# Patient Record
Sex: MALE | Race: WHITE | NOT HISPANIC OR LATINO | URBAN - METROPOLITAN AREA
[De-identification: names, ages, dates, MRNs, and addresses within clinical notes are randomized per-mention and may not be internally consistent; named-entity substitution may affect disease eponyms.]

---
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
## 2023-06-22 PROCEDURE — 86803 HEPATITIS C AB TEST: CPT | Performed by: FAMILY MEDICINE

## 2023-10-06 PROBLEM — F90.0 ATTENTION DEFICIT HYPERACTIVITY DISORDER (ADHD), PREDOMINANTLY INATTENTIVE TYPE: Status: ACTIVE | Noted: 2022-06-16

## 2024-01-08 PROBLEM — L20.82 FLEXURAL ECZEMA: Status: ACTIVE | Noted: 2024-01-08

## 2024-07-27 ENCOUNTER — ON-DEMAND VIRTUAL (OUTPATIENT)
Dept: URGENT CARE | Facility: CLINIC | Age: 19
End: 2024-07-27

## 2024-07-27 DIAGNOSIS — J02.9 SORE THROAT: ICD-10-CM

## 2024-07-27 DIAGNOSIS — J06.9 URI WITH COUGH AND CONGESTION: Primary | ICD-10-CM

## 2024-07-27 PROCEDURE — 99213 OFFICE O/P EST LOW 20 MIN: CPT | Mod: 95,,, | Performed by: NURSE PRACTITIONER

## 2024-07-27 RX ORDER — BENZONATATE 100 MG/1
100 CAPSULE ORAL 3 TIMES DAILY PRN
Qty: 30 CAPSULE | Refills: 0 | Status: SHIPPED | OUTPATIENT
Start: 2024-07-27 | End: 2024-08-06

## 2024-07-27 RX ORDER — PREDNISONE 20 MG/1
20 TABLET ORAL DAILY
Qty: 5 TABLET | Refills: 0 | Status: SHIPPED | OUTPATIENT
Start: 2024-07-27 | End: 2024-08-01

## 2024-07-27 NOTE — PROGRESS NOTES
Subjective:      Patient ID: Torrey Dennis is a 19 y.o. male.    Vitals:  vitals were not taken for this visit.     Chief Complaint: Sinus Problem (Sore throat)      Visit Type: TELE AUDIOVISUAL    Present with the patient at the time of consultation: TELEMED PRESENT WITH PATIENT: None        Past Medical History:   Diagnosis Date    ADHD (attention deficit hyperactivity disorder)      History reviewed. No pertinent surgical history.  Review of patient's allergies indicates:  No Known Allergies  Current Outpatient Medications on File Prior to Visit   Medication Sig Dispense Refill    dextroamphetamine-amphetamine (ADDERALL XR) 30 MG 24 hr capsule Take 1 capsule (30 mg total) by mouth every morning. 30 capsule 0    dextroamphetamine-amphetamine (ADDERALL XR) 30 MG 24 hr capsule Take 1 capsule (30 mg total) by mouth every morning. 30 capsule 0    dextroamphetamine-amphetamine (ADDERALL XR) 30 MG 24 hr capsule Take 1 capsule (30 mg total) by mouth every morning. 30 capsule 0    dextroamphetamine-amphetamine (ADDERALL) 10 mg Tab Take 1 tablet (10 mg total) by mouth after lunch. 30 tablet 0    meloxicam (MOBIC) 15 MG tablet Take 15 mg by mouth once daily.      triamcinolone acetonide 0.1% (KENALOG) 0.1 % cream Apply topically 2 (two) times daily. 80 g 1     No current facility-administered medications on file prior to visit.     No family history on file.    Medications Ordered                [id number] [address] & [address] [address]    Telephone: [phone]   Fax: [phone]   Hours: Open 24 hours                         E-Prescribed (2 of 2)              benzonatate (TESSALON) 100 MG capsule    Sig: Take 1 capsule (100 mg total) by mouth 3 (three) times daily as needed for Cough.       Start: 7/27/24     Quantity: 30 capsule Refills: 0                         predniSONE (DELTASONE) 20 MG tablet    Sig: Take 1  tablet (20 mg total) by mouth once daily. for 5 days       Start: 7/27/24     Quantity: 5 tablet Refills: 0                           Ohs Peq Odvv Intake    7/27/2024 11:47 AM CDT - Filed by Patient   What is your current physical address in the event of a medical emergency? [address] [facility]   Are you able to take your vital signs? No   Please attach any relevant images or files          20 yo male with c/o sore throat for two days. He states he is returning from [place]. He states he has not been able to sleep at night. He does have a cough. He denies fever. He states positive sinus congestion.       ROS     Objective:   The physical exam was conducted virtually.  LOCATION OF PATIENT home  Physical Exam   Constitutional: He is oriented to person, place, and time. He appears well-developed.   HENT:   Head: Normocephalic and atraumatic.   Ears:   Right Ear: Hearing, tympanic membrane and external ear normal.   Left Ear: Hearing, tympanic membrane and external ear normal.   Nose: Nose normal.   Mouth/Throat: Uvula is midline, oropharynx is clear and moist and mucous membranes are normal.   Eyes: Conjunctivae and EOM are normal. Pupils are equal, round, and reactive to light.   Neck: Neck supple.   Cardiovascular: Normal rate.   Pulmonary/Chest: Effort normal and breath sounds normal.   Musculoskeletal: Normal range of motion.         General: Normal range of motion.   Neurological: He is alert and oriented to person, place, and time.   Skin: Skin is warm.   Psychiatric: His behavior is normal. Thought content normal.   Nursing note and vitals reviewed.      Assessment:     1. URI with cough and congestion    2. Sore throat        Plan:   Follow up with your primary care provider if symptoms persist.  Go to the Emergency room for worsening of symptoms.     -Below are suggestions for symptomatic relief:              -Tylenol every 4 hours OR ibuprofen every 6 hours as needed for pain/fever.              -Salt water  gargles to soothe throat pain.              -Chloroseptic spray also helps to numb throat pain.              -Nasal saline spray reduces inflammation and dryness.              -Warm face compresses to help with facial sinus pain/pressure.              -Vicks vapor rub at night.              -Flonase OTC or Nasacort OTC for nasal congestion.              -Simple foods like chicken noodle soup.              -Delsym helps with coughing at night              -Zyrtec/Claritin during the day & Benadryl at night may help with allergies.     If you DO NOT have Hypertension or any history of palpitations, it is ok to take over the counter Sudafed or Mucinex D or Allegra-D or Claritin-D or Zyrtec-D.  If you do take one of the above, it is ok to combine that with plain over the counter Mucinex or Allegra or Claritin or Zyrtec. If, for example, you are taking Zyrtec -D, you can combine that with Mucinex, but not Mucinex-D.  If you are taking Mucinex-D, you can combine that with plain Allegra or Claritin or Zyrtec.   If you DO have Hypertension or palpitations, it is safe to take Coricidin HBP for relief of sinus symptoms.     Please follow up with your Primary care provider within 2-5 days if your signs and symptoms have not resolved or worsen.      If your condition worsens or fails to improve we recommend that you receive another evaluation at the emergency room immediately or contact your primary medical clinic to discuss your concerns.   You must understand that you have received an Urgent Care treatment only and that you may be released before all of your medical problems are known or treated. You, the patient, will arrange for follow up care as instructed.      RED FLAGS/WARNING SYMPTOMS DISCUSSED WITH PATIENT THAT WOULD WARRANT EMERGENT MEDICAL ATTENTION. PATIENT VERBALIZED UNDERSTANDING.       URI with cough and congestion  -     predniSONE (DELTASONE) 20 MG tablet; Take 1 tablet (20 mg total) by mouth once daily. for 5  days  Dispense: 5 tablet; Refill: 0  -     benzonatate (TESSALON) 100 MG capsule; Take 1 capsule (100 mg total) by mouth 3 (three) times daily as needed for Cough.  Dispense: 30 capsule; Refill: 0    Sore throat  -     predniSONE (DELTASONE) 20 MG tablet; Take 1 tablet (20 mg total) by mouth once daily. for 5 days  Dispense: 5 tablet; Refill: 0  -     benzonatate (TESSALON) 100 MG capsule; Take 1 capsule (100 mg total) by mouth 3 (three) times daily as needed for Cough.  Dispense: 30 capsule; Refill: 0

## 2024-08-08 PROCEDURE — 87389 HIV-1 AG W/HIV-1&-2 AB AG IA: CPT | Performed by: FAMILY MEDICINE

## 2024-08-08 PROCEDURE — 86803 HEPATITIS C AB TEST: CPT | Performed by: FAMILY MEDICINE

## 2025-02-07 ENCOUNTER — HOSPITAL ENCOUNTER (EMERGENCY)
Facility: HOSPITAL | Age: 20
Discharge: HOME | End: 2025-02-07
Attending: FAMILY MEDICINE
Payer: COMMERCIAL

## 2025-02-07 VITALS
HEIGHT: 70 IN | RESPIRATION RATE: 17 BRPM | SYSTOLIC BLOOD PRESSURE: 109 MMHG | TEMPERATURE: 98 F | WEIGHT: 185 LBS | DIASTOLIC BLOOD PRESSURE: 73 MMHG | HEART RATE: 92 BPM | BODY MASS INDEX: 26.48 KG/M2 | OXYGEN SATURATION: 99 %

## 2025-02-07 DIAGNOSIS — R07.9 CHEST PAIN: ICD-10-CM

## 2025-02-07 DIAGNOSIS — F41.0 PANIC ATTACK: ICD-10-CM

## 2025-02-07 DIAGNOSIS — F41.9 ANXIETY: Primary | ICD-10-CM

## 2025-02-07 DIAGNOSIS — R73.9 HYPERGLYCEMIA: ICD-10-CM

## 2025-02-07 LAB
ALBUMIN SERPL-MCNC: 4.4 G/DL (ref 3.5–5)
ALBUMIN/GLOB SERPL: 1.2 RATIO (ref 1.1–2)
ALP SERPL-CCNC: 73 UNIT/L (ref 40–150)
ALT SERPL-CCNC: 22 UNIT/L (ref 0–55)
ANION GAP SERPL CALC-SCNC: 11 MEQ/L
AST SERPL-CCNC: 20 UNIT/L (ref 5–34)
BASOPHILS # BLD AUTO: 0.07 X10(3)/MCL
BASOPHILS NFR BLD AUTO: 0.7 %
BILIRUB SERPL-MCNC: 0.3 MG/DL
BUN SERPL-MCNC: 16.7 MG/DL (ref 8.9–20.6)
CALCIUM SERPL-MCNC: 9.4 MG/DL (ref 8.4–10.2)
CHLORIDE SERPL-SCNC: 106 MMOL/L (ref 98–107)
CO2 SERPL-SCNC: 21 MMOL/L (ref 22–29)
CREAT SERPL-MCNC: 1.17 MG/DL (ref 0.72–1.25)
CREAT/UREA NIT SERPL: 14
D DIMER PPP IA.FEU-MCNC: <0.27 UG/ML FEU (ref 0–0.5)
EOSINOPHIL # BLD AUTO: 0.11 X10(3)/MCL (ref 0–0.9)
EOSINOPHIL NFR BLD AUTO: 1.1 %
ERYTHROCYTE [DISTWIDTH] IN BLOOD BY AUTOMATED COUNT: 12.3 % (ref 11.5–17)
GFR SERPLBLD CREATININE-BSD FMLA CKD-EPI: >60 ML/MIN/1.73/M2
GLOBULIN SER-MCNC: 3.6 GM/DL (ref 2.4–3.5)
GLUCOSE SERPL-MCNC: 158 MG/DL (ref 74–100)
HCT VFR BLD AUTO: 44.1 % (ref 42–52)
HGB BLD-MCNC: 15.5 G/DL (ref 14–18)
IMM GRANULOCYTES # BLD AUTO: 0.02 X10(3)/MCL (ref 0–0.04)
IMM GRANULOCYTES NFR BLD AUTO: 0.2 %
LIPASE SERPL-CCNC: 18 U/L
LYMPHOCYTES # BLD AUTO: 3.06 X10(3)/MCL (ref 0.6–4.6)
LYMPHOCYTES NFR BLD AUTO: 31.2 %
MAGNESIUM SERPL-MCNC: 2.1 MG/DL (ref 1.6–2.6)
MCH RBC QN AUTO: 31.6 PG (ref 27–31)
MCHC RBC AUTO-ENTMCNC: 35.1 G/DL (ref 33–36)
MCV RBC AUTO: 90 FL (ref 80–94)
MONOCYTES # BLD AUTO: 1.18 X10(3)/MCL (ref 0.1–1.3)
MONOCYTES NFR BLD AUTO: 12 %
NEUTROPHILS # BLD AUTO: 5.36 X10(3)/MCL (ref 2.1–9.2)
NEUTROPHILS NFR BLD AUTO: 54.8 %
NRBC BLD AUTO-RTO: 0 %
OHS QRS DURATION: 86 MS
OHS QTC CALCULATION: 459 MS
PLATELET # BLD AUTO: 292 X10(3)/MCL (ref 130–400)
PMV BLD AUTO: 11.2 FL (ref 7.4–10.4)
POCT GLUCOSE: 191 MG/DL (ref 70–110)
POTASSIUM SERPL-SCNC: 3.2 MMOL/L (ref 3.5–5.1)
PROT SERPL-MCNC: 8 GM/DL (ref 6.4–8.3)
RBC # BLD AUTO: 4.9 X10(6)/MCL (ref 4.7–6.1)
SODIUM SERPL-SCNC: 138 MMOL/L (ref 136–145)
TROPONIN I SERPL-MCNC: <0.01 NG/ML (ref 0–0.04)
TSH SERPL-ACNC: 2.08 UIU/ML (ref 0.35–4.94)
WBC # BLD AUTO: 9.8 X10(3)/MCL (ref 4.5–11.5)

## 2025-02-07 PROCEDURE — 85379 FIBRIN DEGRADATION QUANT: CPT | Performed by: FAMILY MEDICINE

## 2025-02-07 PROCEDURE — 84443 ASSAY THYROID STIM HORMONE: CPT | Performed by: FAMILY MEDICINE

## 2025-02-07 PROCEDURE — 99285 EMERGENCY DEPT VISIT HI MDM: CPT | Mod: 25

## 2025-02-07 PROCEDURE — 96372 THER/PROPH/DIAG INJ SC/IM: CPT | Performed by: FAMILY MEDICINE

## 2025-02-07 PROCEDURE — 83690 ASSAY OF LIPASE: CPT | Performed by: FAMILY MEDICINE

## 2025-02-07 PROCEDURE — 84484 ASSAY OF TROPONIN QUANT: CPT | Performed by: FAMILY MEDICINE

## 2025-02-07 PROCEDURE — 82962 GLUCOSE BLOOD TEST: CPT

## 2025-02-07 PROCEDURE — 25000003 PHARM REV CODE 250: Performed by: FAMILY MEDICINE

## 2025-02-07 PROCEDURE — 96360 HYDRATION IV INFUSION INIT: CPT

## 2025-02-07 PROCEDURE — 63600175 PHARM REV CODE 636 W HCPCS: Performed by: FAMILY MEDICINE

## 2025-02-07 PROCEDURE — 80053 COMPREHEN METABOLIC PANEL: CPT | Performed by: FAMILY MEDICINE

## 2025-02-07 PROCEDURE — 83735 ASSAY OF MAGNESIUM: CPT | Performed by: FAMILY MEDICINE

## 2025-02-07 PROCEDURE — 85025 COMPLETE CBC W/AUTO DIFF WBC: CPT | Performed by: FAMILY MEDICINE

## 2025-02-07 PROCEDURE — 93005 ELECTROCARDIOGRAM TRACING: CPT

## 2025-02-07 RX ORDER — HYDROXYZINE HYDROCHLORIDE 50 MG/1
50 TABLET, FILM COATED ORAL 3 TIMES DAILY PRN
Qty: 30 TABLET | Refills: 0 | Status: SHIPPED | OUTPATIENT
Start: 2025-02-07

## 2025-02-07 RX ORDER — HYDROXYZINE 50 MG/ML
100 INJECTION, SOLUTION INTRAMUSCULAR ONCE
Status: COMPLETED | OUTPATIENT
Start: 2025-02-07 | End: 2025-02-07

## 2025-02-07 RX ORDER — SODIUM CHLORIDE 9 MG/ML
1000 INJECTION, SOLUTION INTRAVENOUS
Status: COMPLETED | OUTPATIENT
Start: 2025-02-07 | End: 2025-02-07

## 2025-02-07 RX ADMIN — HYDROXYZINE HYDROCHLORIDE 100 MG: 50 INJECTION, SOLUTION INTRAMUSCULAR at 02:02

## 2025-02-07 RX ADMIN — SODIUM CHLORIDE 1000 ML: 9 INJECTION, SOLUTION INTRAVENOUS at 01:02

## 2025-02-07 NOTE — ED PROVIDER NOTES
Encounter Date: 2/7/2025       History     Chief Complaint   Patient presents with    Chest Pain     Pt arrives stating he feels as if he is going to pass out and he is having chest pain. 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  Patient states she was apparently well until about 1 hour prior to presentation when he started feeling markedly weak, having palpitations, shortness of breath, extremely lightheaded, and had a presyncopal episode.  Patient states he then developed a substernal chest pain which started while at rest, pain is described as a pressure-like sensation, radiating to his neck, has no known aggravating or relieving factors, and he denies any similar symptomatology in the past.  Patient denies any history of thyroid disease, no history of alcohol or drug use, no history of anxiety, no history of panic attacks, but he admits to a known history of ADHD currently treated with Adderall.  Patient states he still is having symptoms here in the emergency room but just not as bad    The history is provided by the patient. No  was used.     Review of patient's allergies indicates:  No Known Allergies  Past Medical History:   Diagnosis Date    ADHD (attention deficit hyperactivity disorder)      Past Surgical History:   Procedure Laterality Date    APPENDECTOMY  10/10/2023     No family history on file.  Social History     Tobacco Use    Smoking status: Never    Smokeless tobacco: Never   Substance Use Topics    Alcohol use: Never    Drug use: Never     Review of Systems   Constitutional:  Positive for fatigue. Negative for activity change, chills, diaphoresis and fever.   HENT:  Negative for congestion, ear pain, nosebleeds, rhinorrhea, sinus pressure and sore throat.    Eyes:  Negative for visual disturbance.   Respiratory:  Positive  for shortness of breath. Negative for cough, chest tightness and wheezing.    Cardiovascular:  Positive for chest pain and palpitations.   Gastrointestinal:  Negative for abdominal pain, constipation, diarrhea, nausea and vomiting.   Genitourinary:  Negative for dysuria.   Musculoskeletal:  Negative for arthralgias, back pain, joint swelling and myalgias.   Skin:  Negative for color change and rash.   Neurological:  Positive for light-headedness. Negative for dizziness, syncope, weakness, numbness and headaches.   Psychiatric/Behavioral:  Negative for behavioral problems, self-injury and suicidal ideas.    All other systems reviewed and are negative.      Physical Exam     Initial Vitals [02/07/25 0153]   BP Pulse Resp Temp SpO2   (!) 176/125 109 20 97.6 °F (36.4 °C) 100 %      MAP       --         Physical Exam    Nursing note and vitals reviewed.  Constitutional: He appears well-developed and well-nourished. He is not diaphoretic. No distress.   HENT:   Head: Normocephalic and atraumatic.   Right Ear: External ear normal.   Left Ear: External ear normal.   Nose: Nose normal. Mouth/Throat: Oropharynx is clear and moist. No oropharyngeal exudate.   Eyes: Conjunctivae and EOM are normal. Pupils are equal, round, and reactive to light. No scleral icterus.   Neck: Neck supple. No thyromegaly present. No tracheal deviation present.   Normal range of motion.  Cardiovascular:  Regular rhythm, normal heart sounds and intact distal pulses.     Exam reveals no gallop and no friction rub.       No murmur heard.  Tachycardia   Pulmonary/Chest: Breath sounds normal. No respiratory distress. He has no wheezes. He has no rhonchi. He has no rales.   Abdominal: Abdomen is soft. Bowel sounds are normal. There is no abdominal tenderness. There is no rebound and no guarding.   Musculoskeletal:         General: No tenderness or edema. Normal range of motion.      Cervical back: Normal range of motion and neck supple.      Lymphadenopathy:     He has no cervical adenopathy.   Neurological: He is alert and oriented to person, place, and time. He has normal strength. No cranial nerve deficit. GCS score is 15. GCS eye subscore is 4. GCS verbal subscore is 5. GCS motor subscore is 6.   Skin: Skin is warm and dry.   Psychiatric: His speech is normal and behavior is normal. Judgment and thought content normal. His mood appears anxious. He is not actively hallucinating. Cognition and memory are normal. He is attentive.         ED Course   Procedures  Labs Reviewed   COMPREHENSIVE METABOLIC PANEL - Abnormal       Result Value    Sodium 138      Potassium 3.2 (*)     Chloride 106      CO2 21 (*)     Glucose 158 (*)     Blood Urea Nitrogen 16.7      Creatinine 1.17      Calcium 9.4      Protein Total 8.0      Albumin 4.4      Globulin 3.6 (*)     Albumin/Globulin Ratio 1.2      Bilirubin Total 0.3      ALP 73      ALT 22      AST 20      eGFR >60      Anion Gap 11.0      BUN/Creatinine Ratio 14     CBC WITH DIFFERENTIAL - Abnormal    WBC 9.80      RBC 4.90      Hgb 15.5      Hct 44.1      MCV 90.0      MCH 31.6 (*)     MCHC 35.1      RDW 12.3      Platelet 292      MPV 11.2 (*)     Neut % 54.8      Lymph % 31.2      Mono % 12.0      Eos % 1.1      Basophil % 0.7      Imm Grans % 0.2      Neut # 5.36      Lymph # 3.06      Mono # 1.18      Eos # 0.11      Baso # 0.07      Imm Gran # 0.02      NRBC% 0.0     POCT GLUCOSE - Abnormal    POCT Glucose 191 (*)    LIPASE - Normal    Lipase Level 18     MAGNESIUM - Normal    Magnesium Level 2.10     TSH - Normal    TSH 2.081     TROPONIN I - Normal    Troponin-I <0.010     D DIMER, QUANTITATIVE - Normal    D-Dimer <0.27     CBC W/ AUTO DIFFERENTIAL    Narrative:     The following orders were created for panel order CBC Auto Differential.  Procedure                               Abnormality         Status                     ---------                               -----------         ------                      CBC with Differential[[id number]]       Abnormal            Final result                 Please view results for these tests on the individual orders.     EKG Readings: (Independently Interpreted)   ECG at 1:53 a.m. interpreted by me    Normal sinus rhythm with a ventricular rate of 107 beats per minute, normal axis, no ST segment changes, no new onset Q-waves, no appreciable bundle branch block, normal T-waves, questionably positive voltage criteria for LVH, normal intervals, no delta waves       Imaging Results              X-Ray Chest PA And Lateral (Preliminary result)  Result time 02/07/25 03:25:20      Wet Read by Joan Ahumada MD (02/07/25 03:25:20, [facility] - Emergency Dept, Emergency Medicine)    No acute abnormalities                                     Medications   0.9% NaCl infusion (0 mLs Intravenous Stopped 2/7/25 0248)   hydrOXYzine injection 100 mg (100 mg Intramuscular Given 2/7/25 0205)     Medical Decision Making  Patient's clinical picture seems questionably in keeping with anxiety and a panic attack and due to this IV access is obtained and patient is hydrated with normal saline IV fluids and given hydroxyzine 100 mg IM x1 which he tolerates.  Patient has a cascade of labs drawn and a chest x-ray done. Labs returned relatively within normal limits except for CMP with a potassium of 3.2, bicarb of 21, and glucose of 158.  Accu-Chek returned with a glucose of 191, CBC shows a normal white blood cell count, normal hemoglobin, normal platelets.  D-dimer is noted to be negative, lipase is negative, magnesium is normal at 2.1, and TSH is normal at 2.08.  Chest x-ray returned showing no acute abnormalities and these findings are reviewed with the patient and he verbalizes his understanding.  Patient is monitored for a prolonged period of time here in the emergency room and remained stable.  Upon re-evaluation patient states he feels much better and I do have a high degree  of suspicion that patient's clinical picture seems to be in keeping with anxiety and panic attacks.  Patient states he frequently has these episodes of palpitation and his primary doctor placed him on propranolol for these episodes.  Patient is advised to discuss further with his primary medical doctor about possibly placing him on long-term medication for anxiety.  Patient also states he feels palpitations might be atrial fibrillation and he is advised to discuss this further with his primary medical doctor to determine if he will benefit from a Holter monitor.  Patient at this point will be discharged home with hydroxyzine 50 mg p.o. t.i.d. p.r.n. and he is encouraged to return to the ER if clinical picture worsens.  Patient verbalizes his understanding and his condition upon discharge is stable, vitals remained stable, and he is able to ambulate independently out of the emergency room.    Amount and/or Complexity of Data Reviewed  Labs: ordered.  Radiology: ordered and independent interpretation performed.    Risk  Prescription drug management.                                  1. Differential diagnosis:  Hypothyroidism, , hyperthyroidism, pheochromocytoma, VIPoma  2. Comorbidities considered that were addressed or put patient at increased risk are reviewed and noted  3. External notes reviewed: As stated in MDM  4. Discussed case and management with patient  5. Independent interpretations of workup like ECG, labs, and imaging  6. Diagnostic therapy is considered, ordered and those not considered. Scores considered  7. Social determinants of health considered (living situation and conditions, lives far, family siutation, psychiatric limitations, and possible substance abuse etc)    Clinical Impression:  Final diagnoses:  [R07.9] Chest pain  [F41.9] Anxiety (Primary)  [F41.0] Panic attack  [R73.9] Hyperglycemia       This chart is generated using a voice recognition system.  Grammatical and content areas may  inadvertently be generated in error.  Please contact me if you find a perceive any inappropriate information in this chart.  Thank you.   ED Disposition Condition    Discharge Stable          ED Prescriptions       Medication Sig Dispense Start Date End Date Auth. Provider    hydrOXYzine (ATARAX) 50 MG tablet Take 1 tablet (50 mg total) by mouth 3 (three) times daily as needed for Anxiety. 30 tablet 2/7/2025 -- Joan Ahumada MD          Follow-up Information       Follow up With Specialties Details Why Contact Info    Jose Alfredo Ren MD Family Medicine Call in 1 week As needed [address] [place] [address]  [phone]               Joan Ahumada MD  02/07/25 5546

## 2025-02-07 NOTE — Clinical Note
"Torrey Cuello" Jeannie was seen and treated in our emergency department on 2/7/2025.  He may return to school on 02/10/2025.      If you have any questions or concerns, please don't hesitate to call.      markel DREW"